# Patient Record
Sex: MALE | Race: AMERICAN INDIAN OR ALASKA NATIVE
[De-identification: names, ages, dates, MRNs, and addresses within clinical notes are randomized per-mention and may not be internally consistent; named-entity substitution may affect disease eponyms.]

---

## 2022-05-20 ENCOUNTER — HOSPITAL ENCOUNTER (EMERGENCY)
Dept: HOSPITAL 5 - ED | Age: 37
LOS: 1 days | Discharge: HOME | End: 2022-05-21
Payer: SELF-PAY

## 2022-05-20 DIAGNOSIS — R42: Primary | ICD-10-CM

## 2022-05-20 DIAGNOSIS — F17.200: ICD-10-CM

## 2022-05-20 PROCEDURE — 99282 EMERGENCY DEPT VISIT SF MDM: CPT

## 2022-05-21 VITALS — SYSTOLIC BLOOD PRESSURE: 116 MMHG | DIASTOLIC BLOOD PRESSURE: 80 MMHG

## 2022-05-21 NOTE — EMERGENCY DEPARTMENT REPORT
ED General Adult HPI





- General


Chief complaint: Weakness


Stated complaint: LEFT SIDE FACE WEAKNESS


Time Seen by Provider: 05/21/22 02:07


Source: patient


Mode of arrival: Wheelchair


Limitations: No Limitations





- History of Present Illness


Initial comments: 








mL removed from complaining of having an episode of feeling lightheaded and 

dizzy about 2 months ago he was sitting down on the couch and sat his.  Raise up

out of his body.  Symptoms have resolved spontaneously at the time of the issue 

and he presents to the ER today seeking an evaluation to explain what was 

happening at that time currently he is asymptomatic reports no weakness no chest

pain, no fever, chills, sweats, nausea, vomiting.


Severity scale (0 -10): 0


Improves with: none


Worsens with: none


Associated Symptoms: denies other symptoms


Treatments Prior to Arrival: none





- Related Data


                                Home Medications











 Medication  Instructions  Recorded  Confirmed  Last Taken


 


No Known Home Medications [No  05/20/22 05/20/22 Unknown





Reported Home Medications]    











                                    Allergies











Allergy/AdvReac Type Severity Reaction Status Date / Time


 


No Known Allergies Allergy   Verified 05/20/22 20:59














ED Review of Systems


ROS: 


Stated complaint: LEFT SIDE FACE WEAKNESS


Other details as noted in HPI





Comment: All other systems reviewed and negative





ED Past Medical Hx





- Social History


Smoking Status: Current Every Day Smoker


Substance Use Type: None





- Medications


Home Medications: 


                                Home Medications











 Medication  Instructions  Recorded  Confirmed  Last Taken  Type


 


No Known Home Medications [No  05/20/22 05/20/22 Unknown History





Reported Home Medications]     














ED Physical Exam





- General


Limitations: No Limitations


General appearance: alert, in no apparent distress





- Head


Head exam: Present: atraumatic, normocephalic





- Eye


Eye exam: Present: normal appearance, PERRL, EOMI


Pupils: Present: normal accommodation





- ENT


ENT exam: Present: normal exam, normal orophraynx, mucous membranes moist





- Neck


Neck exam: Present: normal inspection, full ROM.  Absent: tenderness





- Respiratory


Respiratory exam: Present: normal lung sounds bilaterally.  Absent: respiratory 

distress, rales, rhonchi





- Cardiovascular


Cardiovascular Exam: Present: regular rate, normal rhythm.  Absent: systolic 

murmur, diastolic murmur, rubs, gallop





- GI/Abdominal


GI/Abdominal exam: Present: soft, normal bowel sounds





- Rectal


Rectal exam: Present: deferred





- Extremities Exam


Extremities exam: Present: normal inspection





- Back Exam


Back exam: Present: normal inspection





- Neurological Exam


Neurological exam: Present: alert, oriented X3





- Psychiatric


Psychiatric exam: Present: normal affect, normal mood





- Skin


Skin exam: Present: warm, dry, intact, normal color.  Absent: rash





ED Course





                                   Vital Signs











  05/20/22 05/20/22 05/20/22





  19:08 21:47 21:51


 


Temperature 98.2 F 97.8 F 97.8 F


 


Pulse Rate 119 H 105 H 105 H


 


Respiratory 16 16 16





Rate   


 


Blood Pressure 114/78  118/82


 


Blood Pressure  118/82 





[Right]   


 


O2 Sat by Pulse 94 96 96





Oximetry   














ED Medical Decision Making





- Medical Decision Making





37-year-old male presents emerged from complaining of initially occurred 2 

months ago that is no longer present.  No evidence of the any similar issue 

lower or recent.  Advised patient to follow-up with his primary care provider 

for routine evaluation.  Symptoms have preceded the date of when he started was.

  So that should not be a culprit as he has had no symptoms since being on the 

risperidone


Critical care attestation.: 


If time is entered above; I have spent that time in minutes in the direct care 

of this critically ill patient, excluding procedure time.








ED Disposition


Clinical Impression: 


 Feared complaint without diagnosis





Disposition: 01 HOME / SELF CARE / HOMELESS


Is pt being admited?: No


Does the pt Need Aspirin: No


Condition: Stable


Referrals: 


LIANA OWEN MD [Primary Care Provider] - 3-5 Days